# Patient Record
Sex: FEMALE | URBAN - METROPOLITAN AREA
[De-identification: names, ages, dates, MRNs, and addresses within clinical notes are randomized per-mention and may not be internally consistent; named-entity substitution may affect disease eponyms.]

---
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
## 2025-02-23 NOTE — GROUP NOTE
Group Therapy Documentation    PATIENT'S NAME: Robyn Vegas  MRN:   [id number]  :   2005  ACCT. NUMBER: [id number]  DATE OF SERVICE: 3/05/20  START TIME:  8:30 AM  END TIME:  9:00 AM  FACILITATOR(S): Ramirez Holman LADC; Mike Og  TOPIC: BEH Group Therapy  Number of patients attending the group:  7  Group Length:  0.5 Hours    Dimensions addressed 3, 4, 5, and 6    Summary of Group / Topics Discussed:    Group Therapy/Process Group:  Community Group  Patient completed diary card ratings for the last 24 hours including emotions, safety concerns, substance use, treatment interfering behaviors, and use of DBT skills.  Patient checked in regarding the previous evening as well as progress on treatment goals.    Patient Session Goals / Objectives:  * Patient will increase awareness of emotions and ability to identify them  * Patient will report substance use and safety concerns   * Patient will increase use of DBT skills      Group Attendance:  Attended group session    Patient's response to the group topic/interactions:  cooperative with task    Patient appeared to be Engaged.       Client specific details:  Completed DBT diary card and shared events of the past day. She watched some television with her mother and did not think to discuss the family situation with her.     You can access the FollowMyHealth Patient Portal offered by [facility] by registering at the following website: [email or web address] By joining [facility]’s FollowMyHealth portal, you will also be able to view your health information using other applications (apps) compatible with our system.